# Patient Record
Sex: MALE | Race: WHITE | NOT HISPANIC OR LATINO | Employment: OTHER | URBAN - NONMETROPOLITAN AREA
[De-identification: names, ages, dates, MRNs, and addresses within clinical notes are randomized per-mention and may not be internally consistent; named-entity substitution may affect disease eponyms.]

---

## 2019-01-01 ENCOUNTER — TRANSCRIBE ORDERS (OUTPATIENT)
Dept: PHYSICAL THERAPY | Facility: CLINIC | Age: 79
End: 2019-01-01

## 2019-01-01 ENCOUNTER — APPOINTMENT (OUTPATIENT)
Dept: PHYSICAL THERAPY | Facility: CLINIC | Age: 79
End: 2019-01-01
Payer: COMMERCIAL

## 2019-01-01 ENCOUNTER — OFFICE VISIT (OUTPATIENT)
Dept: PHYSICAL THERAPY | Facility: CLINIC | Age: 79
End: 2019-01-01
Payer: COMMERCIAL

## 2019-01-01 ENCOUNTER — EVALUATION (OUTPATIENT)
Dept: PHYSICAL THERAPY | Facility: CLINIC | Age: 79
End: 2019-01-01
Payer: COMMERCIAL

## 2019-01-01 DIAGNOSIS — M54.2 NECK PAIN: Primary | ICD-10-CM

## 2019-01-01 DIAGNOSIS — M43.6 STIFF NECK: ICD-10-CM

## 2019-01-01 DIAGNOSIS — C32.0 MALIGNANT NEOPLASM OF GLOTTIS (HCC): ICD-10-CM

## 2019-01-01 PROCEDURE — 97535 SELF CARE MNGMENT TRAINING: CPT | Performed by: PHYSICAL THERAPIST

## 2019-01-01 PROCEDURE — 97162 PT EVAL MOD COMPLEX 30 MIN: CPT | Performed by: PHYSICAL THERAPIST

## 2019-01-01 PROCEDURE — 97035 APP MDLTY 1+ULTRASOUND EA 15: CPT | Performed by: PHYSICAL THERAPIST

## 2019-01-01 PROCEDURE — 97110 THERAPEUTIC EXERCISES: CPT | Performed by: PHYSICAL THERAPIST

## 2019-01-01 PROCEDURE — 97140 MANUAL THERAPY 1/> REGIONS: CPT | Performed by: PHYSICAL THERAPIST

## 2019-09-04 NOTE — PROGRESS NOTES
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      Assessment  Assessment details: [facility] a [facility]  Impairments: abnormal or restricted ROM, abnormal movement, activity intolerance, lacks appropriate home exercise program, pain with function, safety issue, scapular dyskinesis and poor posture   Understanding of Dx/Px/POC: excellent   Prognosis: good    Goals  STG 2-4 weeks:   Increase Neck strength by 3-6 lbs  Decrease pain to <5/10 with activity  Increase Neck PROM to [facility] all planes  Initiate HEP  LTG 6-8 weeks:   Demonstrate Neck AROM WFL all planes  Decrease pain to 1-2/10 with activity  Increase Neck strength by 10-15 lbs  Improve UE flexibility  Improve strength with UE by 10-15 lbs  Patient independent with HEP  Plan  Plan details: Patient stated he can only afford once per week for his treatments    Patient would benefit from: PT eval and skilled physical therapy  Planned modality interventions: unattended electrical stimulation  Planned therapy interventions: joint mobilization, manual therapy, patient education, postural training, graded exercise, home exercise program, flexibility and coordination  Frequency: 3x week  Duration in weeks: 6  Treatment plan discussed with: patient      Subjective Evaluation    Pain  Relieving factors: heat, relaxation, rest and support  Aggravating factors: overhead activity and lifting  Progression: worsening    Treatments  Current treatment: physical therapy  Patient Goals  Patient goals for therapy: decreased pain, increased motion, return to work, return to [employer] activities, independence with ADLs/IADLs and increased strength        Objective    Date of onset: 8/15/2019    Date of Surgery:  None    History of Present Episode: 9/4/2019  Julien Chi states 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  He has received a lot of treatments  He now has a very stiff neck with limited movement  Past Medical History:    9/4/2019  Julien Chi reports asthma  Throat cancer  Type two diabetes  Previous Level of Functional Ability:  9/4/2019  Julien Morganjonathon states his neck movement was fine with no limitations  Inspection / Palpation:  Neck:  9/4/2019  Ectomorphic / Mesomorphic body type  No signs of infection  No signs of wounds  No signs of drainage  No signs of ecchymotic regions  No signs of erythremic regions  Moderate signs of muscle spasm  Moderate signs of muscle guarding  Mild to moderate signs of tenderness reported to palpation  No signs of swelling  No signs of a surgery site  Current conditions appears consistent with recent acute episode  Chief Complaints:  9/4/2019  Julien Morganjonathon reports no difficulty with standing  Juliennas Chi reports no difficulty with walking  Juliennas Chi reports moderate to severe difficulty with movement / use of his neck region  Juliennas Chi reports moderate difficulty with use of his arms  Juliennas Chi reports no difficulty with sleeping  Julien Morganjonathon reports moderate difficulty with his strength and endurance  Julienshaji Chi reports moderate limitations with his neck range of motion  Juliennas Chi reports no difficulty lying on his neck region  Julien Morganjonathon reports moderate difficulty twisting / turning his neck region      NECK PAIN  Resting Palpation Bending  Forward Rotate  Right Rotate  Left   9/4/2019 0 0 0 0 0     NECK PAIN Driving Sleeping Moving Extending Overhead   9/4/2019 0 0 0 0 0     NECK AROM Flexion Extension Rotation Right   9/4/2019 32° 31° 46°     NECK AROM Rotation Left Side Bend Right Side Bend Left   9/4/2019 47° 12° 8°     NECK MMT Flexion Extension Rotation Right   9/4/2019 0/10 8 lbs 0/10 9 lbs 0/10 6 lbs     NECK MMT Rotation Left Side Bend Right Side Bend Left   9/4/2019 0/10 6 lbs 0/10 5 lbs 0/10 6 lbs     Neck Screen Compression Distraction Slump Test Epting / Vertigo   9/4/2019 Rt Negative Negative Negative Negative   9/4/2019 Lt Negative Negative Negative Negative     Neck Screen Swallowing Valsalva Adson TMJ   9/4/2019 Rt Negative Negative Negative Negative   9/4/2019 Lt Negative Negative Negative Negative     Neck Screen Referred Pain Thoracic outlet Crepitus   9/4/2019 Rt Negative Negative Negative   9/4/2019 Lt Negative Negative Negative     Precautions:  Limited Neck Movement  Daily Treatment Log  Manual  9/4       MT, ROM 15'       HEP 15'       Exercise Log 9/4       Digiflex, Powerweb        FWC, Codman's, etc        UBC        Dawson-BP,PD,Lats, Row        Trimax-BP        W/P-PNF,IR,ER,Pu,Ps,Throw-Top  Mid,Bot        Finger Ladder        ME, PE 15'               Modalities 9/4       MH&ES MH-20'

## 2019-09-04 NOTE — LETTER
2019   Plan of Care / PT Evaluation  Cherelle Wilson MD  [address] [facility] [address]    Patient: Nely Puri   YOB: 1940   Date of Visit: 2019     Encounter Diagnosis     ICD-10-CM    1  Neck pain M54 2    2  Stiff neck M43 6    3  Malignant neoplasm of glottis [facility]) C32 0      Dear Dr Armando Lewis: Thank you for your recent referral of Nely Puri  Please review the attached evaluation summary from [place]'s recent visit  Please verify that you agree with the plan of care by signing the attached order  If you have any questions or concerns, please do not hesitate to call  I sincerely appreciate the opportunity to share in the care of one of your patients and hope to have another opportunity to work with you in the near future  Sincerely,    Maday Aldrich, PT    Referring Provider:      I certify that I have read the below Plan of Care and certify the need for these services furnished under this plan of treatment while under my care  Cherelle Wilson MD  [address] [facility] [address]: [phone]    Please sign above and return this page to fax number [phone]        PT Evaluation   Today's date: 2019  Patient name: Nely Puri  : 1940  MRN: [id number]  Referring provider: Willow Sandoval MD  Dx:   Encounter Diagnosis     ICD-10-CM    1  Neck pain M54 2    2  Stiff neck M43 6    3  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      Assessment  Assessment details: Patient has a severe stiff neck from radiation throat cancer [facility]  Impairments: abnormal or restricted ROM, abnormal movement, activity intolerance, lacks appropriate home exercise program, pain with function, safety issue, scapular dyskinesis and poor posture   Understanding of Dx/Px/POC: excellent   Prognosis: good    Goals  STG 2-4 weeks:   Increase Neck strength by 3-6 lbs  Decrease pain to <5/10 with activity     Increase Neck PROM to [facility] all planes  Initiate HEP  LTG 6-8 weeks:   Demonstrate Neck AROM WFL all planes  Decrease pain to 1-2/10 with activity  Increase Neck strength by 10-15 lbs  Improve UE flexibility  Improve strength with UE by 10-15 lbs  Patient independent with HEP  Plan  Plan details: Patient stated he can only afford once per week for his treatments  Patient would benefit from: PT eval and skilled physical therapy  Planned modality interventions: unattended electrical stimulation  Planned therapy interventions: joint mobilization, manual therapy, patient education, postural training, graded exercise, home exercise program, flexibility and coordination  Frequency: 3x week  Duration in weeks: 6  Treatment plan discussed with: patient      Subjective Evaluation    Pain  Relieving factors: heat, relaxation, rest and support  Aggravating factors: overhead activity and lifting  Progression: worsening    Treatments  Current treatment: physical therapy  Patient Goals  Patient goals for therapy: decreased pain, increased motion, return to work, return to [employer] activities, independence with ADLs/IADLs and increased strength        Objective    Date of onset:  8/15/2019    Date of Surgery:  None    History of Present Episode: 9/4/2019  Mariaa Mathew states 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  He has received a lot of treatments  He now has a very stiff neck with limited movement  Past Medical History:    9/4/2019  Mariaa Mathew reports asthma  Throat cancer  Type two diabetes  Previous Level of Functional Ability:  9/4/2019  Mariaa Mathew states his neck movement was fine with no limitations  Inspection / Palpation:  Neck:  9/4/2019  Ectomorphic / Mesomorphic body type  No signs of infection  No signs of wounds  No signs of drainage  No signs of ecchymotic regions  No signs of erythremic regions  Moderate signs of muscle spasm  Moderate signs of muscle guarding     Mild to moderate signs of tenderness reported to palpation  No signs of swelling  No signs of a surgery site  Current conditions appears consistent with recent acute episode  Chief Complaints:  9/4/2019  Elena Herter reports no difficulty with standing  Elena Herter reports no difficulty with walking  Elena Herter reports moderate to severe difficulty with movement / use of his neck region  Elena Herter reports moderate difficulty with use of his arms  Elena Herter reports no difficulty with sleeping  Elena Herter reports moderate difficulty with his strength and endurance  Elena Herter reports moderate limitations with his neck range of motion  Elena Herter reports no difficulty lying on his neck region  Elena Herter reports moderate difficulty twisting / turning his neck region  NECK PAIN  Resting Palpation Bending  Forward Rotate  Right Rotate  Left   9/4/2019 0 0 0 0 0     NECK PAIN Driving Sleeping Moving Extending Overhead   9/4/2019 0 0 0 0 0     NECK AROM Flexion Extension Rotation Right   9/4/2019 32° 31° 46°     NECK AROM Rotation Left Side Bend Right Side Bend Left   9/4/2019 47° 12° 8°     NECK MMT Flexion Extension Rotation Right   9/4/2019 0/10 8 lbs 0/10 9 lbs 0/10 6 lbs     NECK MMT Rotation Left Side Bend Right Side Bend Left   9/4/2019 0/10 6 lbs 0/10 5 lbs 0/10 6 lbs     Neck Screen Compression Distraction Slump Test Epting / Vertigo   9/4/2019 Rt Negative Negative Negative Negative   9/4/2019 Lt Negative Negative Negative Negative     Neck Screen Swallowing Valsalva Adson TMJ   9/4/2019 Rt Negative Negative Negative Negative   9/4/2019 Lt Negative Negative Negative Negative     Neck Screen Referred Pain Thoracic outlet Crepitus   9/4/2019 Rt Negative Negative Negative   9/4/2019 Lt Negative Negative Negative     Precautions:  Limited Neck Movement      Daily Treatment Log  Manual  9/4       MT, ROM 15'       HEP 15'       Exercise Log 9/4       Digiflex, Powerweb        FWC, Codman's, etc        UBC        Dawson-BP,PD,Lats, Row        Trimax-BP W/P-PNF,IR,ER,Pu,Ps,Throw-Top  Mid,Bot        Finger Ladder        ME, PE 15'               Modalities 9/4       MH&ES MH-20'

## 2019-09-20 NOTE — PROGRESS NOTES
Today's date: 2019  Patient name: Maryann Murphy  : 1940  MRN: [id number]  Referring provider: Beverley Friend MD  Dx:   Encounter Diagnosis     ICD-10-CM    1  Neck pain M54 2    2  Stiff neck M43 6    3  Malignant neoplasm of glottis ([facility] [address] ) C32 0      Subjective:  Aftab Aleman states his neck is stiff today  Objective: See treatment log below    Precautions:  Limited Neck Movement  Daily Treatment Log  Manual        MT, ROM 15' 15'      HEP 15'       Exercise Log       Digiflex, Powerweb        FWC, Codman's, etc        UBC  10'      Dawson-BP,PD,Lats, Row        Trimax-BP        W/P-PNF,IR,ER,Pu,Ps,Throw-Top  Mid,Bot        Finger Ladder        ME, PE 15' 15'              Modalities       MH&ES MH-20' MH-20'          Assessment: Tolerated treatment well  Patient exhibited good technique with therapeutic exercises and would benefit from continued PT    Plan: Continue per plan of care

## 2019-09-20 NOTE — LETTER
2019  Signature Required / Plan Of Care / PT Discharge  Adolfo Cedeno MD  [address] [facility][note 3] [id number]    Patient: Kathleen Elliott   YOB: 1940   Date of Visit: 2019     Encounter Diagnosis     ICD-10-CM    1  Neck pain M54 2    2  Stiff neck M43 6    3  Malignant neoplasm of glottis [facility]) C32 0      Dear Dr Chavez Cornea: Thank you for your recent referral of Kathleen Elliott  Please review the attached evaluation summary from Timmy's recent visit  Please verify that you agree with the plan of care by signing the attached order  If you have any questions or concerns, please do not hesitate to call  I sincerely appreciate the opportunity to share in the care of one of your patients and hope to have another opportunity to work with you in the near future  Sincerely,    Kathya Greenfield PT    Referring Provider:    I certify that I have read the below Plan of Care and certify the need for these services furnished under this plan of treatment while under my care  Adolfo Cedeno MD  [address] [facility][note [address]] [id number]  VIA Facsimile: [phone]    Please SIGN ABOVE and return THIS PAGE ONLY to Fax # [phone]          Today's date: 2019  Patient name: Aracelis Paintnig  : 1940  MRN: [id number]  Referring provider: Tricia Argueta MD  Dx:   Encounter Diagnosis     ICD-10-CM    1  Neck pain M54 2    2  Stiff neck M43 6    3  Malignant neoplasm of glottis ([facility] [address] ) C32 0      Subjective:  Julien Chi states his neck is stiff today  Objective: See treatment log below    Precautions:  Limited Neck Movement  Daily Treatment Log  Manual        MT, ROM 15' 15'      HEP 15'       Exercise Log       Digiflex, Powerweb        FWC, Codman's, etc        UBC  10'      Dawson-BP,PD,Lats, Row        Trimax-BP        W/P-PNF,IR,ER,Pu,Ps,Throw-Top  Mid,Bot        Finger Ladder        ME, PE 15' 15'              Modalities  MH&ES MH-20' MH-20'          Assessment: Tolerated treatment well  Patient exhibited good technique with therapeutic exercises and would benefit from continued PT    Plan: Continue per plan of care  PT Discharge  Today's date: 10/7/2019  Patient name: Tanya Stephen  : 1940  MRN: [id number]  Referring provider: Jeri Khoury MD  Dx:   Encounter Diagnosis     ICD-10-CM    1  Neck pain M54 2    2  Stiff neck M43 6    3  Malignant neoplasm of glottis ([facility] [address] ) C32 0      Assessment/Plan    Subjective    Objective     10/7/2019  Sundar Cho has not returned for more treatment  Tanya Stephen did not attend today's appointment  I cannot provide you with a current progress report but I can provide you with information based on previous performance  Tanya Stephen is discharged at this time

## 2019-10-07 NOTE — PROGRESS NOTES
PT Discharge  Today's date: 10/7/2019  Patient name: Neel Glass  : 1940  MRN: [id number]  Referring provider: Willow Sandoval MD  Dx:   Encounter Diagnosis     ICD-10-CM    1  Neck pain M54 2    2  Stiff neck M43 6    3  Malignant neoplasm of glottis ([facility] [address] ) C32 0      Assessment/Plan    Subjective    Objective     10/7/2019  Pj Wall has not returned for more treatment  Neel Glass did not attend today's appointment  I cannot provide you with a current progress report but I can provide you with information based on previous performance  Neel Glass is discharged at this time